# Patient Record
Sex: MALE | Race: WHITE
[De-identification: names, ages, dates, MRNs, and addresses within clinical notes are randomized per-mention and may not be internally consistent; named-entity substitution may affect disease eponyms.]

---

## 2017-04-02 NOTE — RAD
INDICATION:  Cough and fever.



COMPARISON:  Comparison is made with a prior chest x-ray study from February 13, 2012.



TECHNIQUE: Dual-energy PA  and lateral views of the chest were obtained.



FINDINGS:   The heart is within normal limits in size. Mediastinal and hilar contours

appear within normal limits.



The lungs are clear. No pleural effusion is present. 



IMPRESSION:  NO EVIDENCE FOR ACTIVE CARDIOPULMONARY DISEASE.

## 2017-04-02 NOTE — ED
Influenza-Like Illness





- HPI Summary


HPI Summary: 


26M presents with fever for 2 days.  He also admits to headache, cough, sore 

throat, and body aches.  He denies any chest pain or SOB. He has been using 

dayquil and nyquil. He states that his appetite has been about the same.  He 

denies any ear pain or abdominal pain or n/v/d.  He admits to nasal congestion. 








- History of Current Complaint


Chief Complaint: EDFluSymptoms


Time Seen by Provider: 04/02/17 22:19





- Allergy/Home Medications


Allergies/Adverse Reactions: 


 Allergies











Allergy/AdvReac Type Severity Reaction Status Date / Time


 


No Known Allergies Allergy   Verified 10/13/16 14:03














PMH/Surg Hx/FS Hx/Imm Hx


Endocrine/Hematology History: 


   Denies: Hx Anticoagulant Therapy, Hx Blood Disorders, Hx Diabetes, Hx 

Thyroid Disease


Cardiovascular History: 


   Denies: Hx Hypertension


Respiratory History: 


   Denies: Hx Asthma, Hx Chronic Obstructive Pulmonary Disease (COPD)


GI History: 


   Denies: Hx Ulcer


Neurological History: Reports: Hx Seizures - childhood resolved





- Immunization History


Date of Tetanus Vaccine: TODAY


Infectious Disease History: No


Infectious Disease History: 


   Denies: Hx Clostridium Difficile, Hx Hepatitis, Hx Human Immunodeficiency 

Virus (HIV), Hx of Known/Suspected MRSA, Hx Shingles, Hx Tuberculosis, Hx Known/

Suspected VRE, Hx Known/Suspected VRSA, History Other Infectious Disease, 

Traveled Outside the [place] in Last 30 Days





- Family History


Known Family History: Positive: Unknown, Hypertension





- Social History


Alcohol Use: Rare


Substance Use Type: Reports: None


Smoking Status (MU): Light Every Day Tobacco Smoker


Type: Cigarettes


Amount Used/How Often: 1/2-1 PPD


Length of Time of Smoking/Using Tobacco: 4-5 years


Have You Smoked in the Last Year: Yes





Review of Systems


Positive: Fever


Positive: Sore Throat


Negative: Chest Pain


Positive: Cough.  Negative: Shortness Of Breath


Negative: Abdominal Pain, Vomiting, Diarrhea, Nausea


All Other Systems Reviewed And Are Negative: Yes





Physical Exam


Triage Information Reviewed: Yes


Vital Signs On Initial Exam: 


 Initial Vitals











Temp Pulse Resp BP Pulse Ox


 


 101.3 F   109   15   127/73   98 


 


 04/02/17 21:40  04/02/17 21:40  04/02/17 21:40  04/02/17 21:40  04/02/17 21:40











Vital Signs Reviewed: Yes


Appearance: Positive: Ill-Appearing


Skin: Positive: Warm, Dry


Head/Face: Positive: Normal Head/Face Inspection


Eyes: Positive: Normal, Conjunctiva Clear


ENT: Positive: Pharyngeal erythema, TMs normal.  Negative: Tonsillar swelling, 

Tonsillar exudate, Trismus, Muffled/hoarse voice


Neck: Positive: Supple, Nontender, No Lymphadenopathy


Respiratory/Lung Sounds: Positive: Clear to Auscultation, Breath Sounds Present


Cardiovascular: Positive: Normal, RRR


Abdomen Description: Positive: Nontender, Soft


Bowel Sounds: Positive: Present





- Анна Coma Scale


Coma Scale Total: 15





Diagnostics





- Vital Signs


 Vital Signs











  Temp Pulse Resp BP Pulse Ox


 


 04/02/17 21:40  101.3 F  109  15  127/73  98














- Laboratory


Result Diagrams: 


 04/02/17 23:22





 04/02/17 23:22


Lab Statement: Any lab studies that have been ordered have been reviewed, and 

results considered in the medical decision making process.





- Radiology


  ** chest


Xray Interpretation: No Acute Changes


Radiology Interpretation Completed By: Radiologist





Flu Symptom Course/Dx





- Course


Course Of Treatment: 26M presents with fever and cough for 2 days. he also 

admits to nasal congestion and sore throat. he is a smoker. on exam lungs CTA. 

appears ill. chest xray normal. labs normal: wbc 10, flu and strept negative. 

will treat supportaviely told to take ibuprofen every 6 hours. patient 

understands and agrees with plan





- Diagnoses


Differential Diagnosis/HQI/PQRI: Positive: Bronchitis, Influenza, Pneumonia, 

Upper Respiratory Infection


Provider Diagnoses: 


 Upper respiratory infection








Discharge





- Discharge Plan


Condition: Good


Disposition: HOME


Patient Education Materials:  Upper Respiratory Infection (ED)


Referrals: 


[facility] PHYSICIAN REFERRAL [Outside]


Additional Instructions: 


Take ibuprofen every 6 hours for fever


Use saline in the nose for nasal congetion


Use humidifier or place warm bowls of water around the room for cough


Increase fluid intake and eat small amounts of food as tolerated


Establish care with primary care physician


Return to ED if develop chest pain , shortness of breath, or any new or 

worsening symptoms

## 2017-08-25 NOTE — RAD
HISTORY: Trauma, pleuritic chest pain



COMPARISONS: None



TECHNIQUE: Multiple contiguous axial CT scans were obtained of the chest, abdomen, and

pelvis after the administration of intravenous contrast. Coronal and sagittal multiplanar

reformations are submitted for review.. Oral contrast was not administered. Delayed images

were obtained through the abdomen and pelvis.





FINDINGS:    



CHEST



NECK AND THYROID: The lower neck and thyroid are unremarkable.

CHEST WALL: There is no lower cervical, axillary, or supraclavicular lymphadenopathy by

size criteria.



HEART AND PERICARDIUM: The heart is unremarkable.

AORTA AND PULMONARY VASCULATURE: The aorta and pulmonary vasculature are normal.



MEDIASTINUM: There is no mediastinal lymphadenopathy by size criteria.

SIERRA: There is no hilar lymphadenopathy by size criteria.



AIRWAY AND ESOPHAGUS: The airway is unremarkable, without endobronchial filling defect.

The esophagus is grossly normal.

LUNG PARENCHYMA: The lungs are clear.

PLEURA: No pleural abnormalities are noted.



BONES AND SOFT TISSUES: No bone or soft tissue abnormalities are noted.



ABDOMEN/PELVIS:



LIVER: The liver is normal in shape, size, contour, and attenuation.

BILE DUCTS: There is no intrahepatic or extrahepatic biliary dilatation.

GALLBLADDER: The gallbladder is normal, without pericholecystic inflammatory change.



PANCREAS: The pancreas is normal, without mass or ductal dilatation.

SPLEEN: Normal in size and appearance.



UPPER GI TRACT: Evaluation of the gastrointestinal tract is limited by incomplete gastric

distention. The upper GI tract is unremarkable.

SMALL BOWEL \T\ MESENTERY:  The small bowel is normal in contour, course, and caliber.

There is no obstruction or dilatation.

COLON:   The colon is normal in contour, course, caliber. There is no pericolonic

inflammatory change.



ADRENALS: Normal bilaterally.

KIDNEYS: The kidneys are normal in shape, size, contour, and axis. There is no

hydronephrosis or nephrolithiasis.

BLADDER: The bladder is smooth in contour.



PELVIC ORGANS: The prostate gland is normal. The seminal vesicles are symmetric.



AORTA: The aorta is normal.

IVC: Unremarkable



LYMPH NODES: There is no lymphadenopathy by size criteria.



ABDOMINAL WALL: There is no evidence for abdominal wall hernia.

BONES: There is mild disc bulge at L5-S1.

OTHER: There is no free intraperitoneal fluid or free intraperitoneal gas. There is no

active arterial extravasation.



IMPRESSION:

NO ACUTE CT PATHOLOGY OF THE VISUALIZED CHEST, ABDOMEN, OR PELVIS.

## 2018-06-26 NOTE — ED
Head Injury





- HPI Summary


HPI Summary: 





Patient complains of head injury today at 7 PM with subsequent HA and 

laceration to left side head anterior to left ear.  States he was trying to 

remove fencepost from the ground and when he pulled it hit him in the left side 

of the head.  Denies LOC, N/V, AMS, vision change, focal deficits, trauma to 

face, nose, tongue, teeth, lips.  Denies any other pain or injury.  Tetanus 

status unknown.  Medical history is none.  Denies anti-coag.





- History Of Current Complaint


Chief Complaint: EDHeadInjury


Stated Complaint: HEAD INJURY


Time Seen by Provider: 06/26/18 22:33


Hx Obtained From: Patient


Mechanism Of Injury: Blunt Trauma


Onset/Duration: Started Hours Ago


Onset of Pain: Immediate


Severity Currently: Moderate


Severity Initially: Moderate


Pain Intensity: 5


Pain Scale Used: 0-10 Numeric


Location of Head Injury: Temporal


Character: Throbbing


Associated Signs And Symptoms: Headache





- Allergies/Home Medications


Allergies/Adverse Reactions: 


 Allergies











Allergy/AdvReac Type Severity Reaction Status Date / Time


 


No Known Allergies Allergy   Verified 06/26/18 20:00














PMH/Surg Hx/FS Hx/Imm Hx


Endocrine/Hematology History: 


   Denies: Hx Anticoagulant Therapy, Hx Blood Disorders, Hx Diabetes, Hx 

Thyroid Disease


Cardiovascular History: 


   Denies: Hx Hypertension


Respiratory History: 


   Denies: Hx Asthma, Hx Chronic Obstructive Pulmonary Disease (COPD)


GI History: 


   Denies: Hx Ulcer


 History: 


   Denies: Hx Dialysis


Neurological History: Reports: Hx Migraine, Hx Seizures - childhood resolved





- Immunization History


Date of Tetanus Vaccine: TODAY


Infectious Disease History: No


Infectious Disease History: 


   Denies: Hx Clostridium Difficile, Hx Hepatitis, Hx Human Immunodeficiency 

Virus (HIV), Hx of Known/Suspected MRSA, Hx Shingles, Hx Tuberculosis, Hx Known/

Suspected VRE, Hx Known/Suspected VRSA, History Other Infectious Disease, 

Traveled Outside the [place] in Last 30 Days





- Family History


Known Family History: Positive: Unknown - ADOPTED, Hypertension





- Social History


Alcohol Use: Occasionally


Substance Use Type: Reports: None


Smoking Status (MU): Light Every Day Tobacco Smoker


Type: Cigarettes


Amount Used/How Often: 1/2-1 PPD


Length of Time of Smoking/Using Tobacco: 4-5 years


Have You Smoked in the Last Year: Yes





Review of Systems


Constitutional: Negative


Eyes: Negative


ENT: Negative


Cardiovascular: Negative


Respiratory: Negative


Gastrointestinal: Negative


Genitourinary: Negative


Musculoskeletal: Negative


Skin: Other


Positive: Headache


Psychological: Normal


All Other Systems Reviewed And Are Negative: Yes





Physical Exam





- Summary


Physical Exam Summary: 





Neuro exam normal.  Pupils equal and reactive bilaterally.  Full range of 

motion of jaw and neck.  No evidence of trauma to face, nose, lips, tongue, 

teeth, eyes.  No ecchymosis, swelling, deformity, erythema to left side of head 

or face.  2 cm laceration anterior to left ear, is already scabbed over.


Triage Information Reviewed: Yes


Vital Signs On Initial Exam: 


 Initial Vitals











Temp Pulse Resp BP Pulse Ox


 


 98.5 F   74   16   131/88   97 


 


 06/26/18 19:57  06/26/18 19:57  06/26/18 19:57  06/26/18 19:57  06/26/18 19:57











Vital Signs Reviewed: Yes


Appearance: Positive: Well-Appearing


Skin: Positive: Warm


Head/Face: Positive: Normal Head/Face Inspection


Eyes: Positive: Normal


ENT: Positive: Normal ENT inspection


Neck: Positive: Supple


Respiratory/Lung Sounds: Positive: Clear to Auscultation


Cardiovascular: Positive: Normal


Abdomen Description: Positive: Nontender


Musculoskeletal: Positive: Normal


Neurological: Positive: Normal


Psychiatric: Positive: Normal


AVPU Assessment: Alert





- Анна Coma Scale


Best Eye Response: 4 - Spontaneous


Best Motor Response: 6 - Obeys Commands


Best Verbal Response: 5 - Oriented


Coma Scale Total: 15





Diagnostics





- Vital Signs


 Vital Signs











  Temp Pulse Resp BP Pulse Ox


 


 06/26/18 21:58  98.9 F  61  18  140/91  99


 


 06/26/18 19:57  98.5 F  74  16  131/88  97














- Laboratory


Lab Statement: Any lab studies that have been ordered have been reviewed, and 

results considered in the medical decision making process.





Head Injury Course/Dx


Course Of Treatment: Patient complains of head injury today at 7 PM with 

subsequent HA and laceration to left side head anterior to left ear.  States he 

was trying to remove fencepost from the ground and when he pulled it hit him in 

the left side of the head.  Denies LOC, N/V, AMS, vision change, focal deficits

, trauma to face, nose, tongue, teeth, lips.  Denies any other pain or injury.  

Tetanus status unknown.  Medical history is none.  Denies anti-coag.  No 

indication for CT brain as patient denies LOC, N/V, any neuro symptoms.  

Laceration is scabbed over, patient opted to not have his laceration sutured.  

Tetanus status unknown, posterior shot provided here in the ED along with 

Tylenol and ibuprofen for Pain.  Vital signs within normal limits and stable.  

Neuro exam normal.  Rx for Keflex.





- Diagnoses


Provider Diagnoses: 


 Head injury








Discharge





- Sign-Out/Discharge


Documenting (check all that apply): Discharge/Admit/Transfer





- Discharge Plan


Condition: Stable


Disposition: HOME


Patient Education Materials:  Head Injury (ED)


Referrals: 


No Primary Care Phys,NOPCP [Primary Care Provider] - 


[facility] [Outside]


Additional Instructions: 


Take antibiotics as directed.  Follow-up with primary care.  Return to the ED 

for any new or worsening symptoms





- Billing Disposition and Condition


Condition: STABLE


Disposition: Home

## 2020-02-03 ENCOUNTER — HOSPITAL ENCOUNTER (EMERGENCY)
Dept: HOSPITAL 25 - UCEAST | Age: 30
Discharge: HOME | End: 2020-02-03
Payer: COMMERCIAL

## 2020-02-03 VITALS — DIASTOLIC BLOOD PRESSURE: 80 MMHG | SYSTOLIC BLOOD PRESSURE: 120 MMHG

## 2020-02-03 DIAGNOSIS — R10.84: Primary | ICD-10-CM

## 2020-02-03 DIAGNOSIS — F17.210: ICD-10-CM

## 2020-02-03 PROCEDURE — 81003 URINALYSIS AUTO W/O SCOPE: CPT

## 2020-02-03 PROCEDURE — G0463 HOSPITAL OUTPT CLINIC VISIT: HCPCS

## 2020-02-03 PROCEDURE — 99211 OFF/OP EST MAY X REQ PHY/QHP: CPT

## 2020-02-03 NOTE — UC
Abdominal Pain Male HPI





- HPI Summary


HPI Summary: 


Patient is a 28yo male presenting with generalized intermittent abdominal pain 

x2 days. Described it as dull ache. Patient states he ate clams on Friday night 

and symptoms began shortly after. States he vomited twice at symptoms onset. 

Denies hematemesis. Denies any n/v since. Denies diarrhea or changes in BMs. 

Notes slight decreased appetite and fluid intake. Denies current pain or 

nausea. Denies urinary symptoms. Does note mild dry cough and runny nose. 

Denies fever and chills. Notes body aches. Denies taking anything for symptom 

relief.








- History of Current Complaint


Chief Complaint: UCAbdominalPain


Stated Complaint: ABDOMINAL PAIN,NAUSEA


Hx Obtained From: Patient


Pain Intensity: 5


Pain Scale Used: 0-10 Numeric





- Allergies/Home Medications


Allergies/Adverse Reactions: 


 Allergies











Allergy/AdvReac Type Severity Reaction Status Date / Time


 


No Known Allergies Allergy   Verified 02/03/20 14:40











Home Medications: 


 Home Medications





NK [No Home Medications Reported]  02/03/20 [History Confirmed 02/03/20]











PMH/Surg Hx/FS Hx/Imm Hx


Other History Of: 


   Negative For: Anticoagulant Therapy





- Surgical History


Surgical History: None





- Family History


Known Family History: Positive: Unknown - ADOPTED, Hypertension





- Social History


Alcohol Use: Rare


Substance Use Type: None


Smoking Status (MU): Light Every Day Tobacco Smoker


Type: Cigarettes


Amount Used/How Often: 1/2-1 PPD


Length of Time of Smoking/Using Tobacco: 4-5 years


Have You Smoked in the Last Year: Yes


Household Exposure Type: Cigarettes





- Immunization History


Most Recent Influenza Vaccination: none





Review of Systems


All Other Systems Reviewed And Are Negative: Yes


Constitutional: Positive: Negative





Physical Exam





- Summary


Physical Exam Summary: 


Vital Signs Reviewed: Yes


A+Ox3, no distress, well-appearing


Eyes: Conjunctiva Clear


ENT: Hearing grossly normal  TM x 2 clear, moist, uvula midline, no exudate, no 

erythema


Neck: Positive: Supple


Respiratory: Positive: No respiratory distress, No accessory muscle use + CTA 

throughout  no w/r


Cardiovascular: RRR  nl s1, s2  no m/r 


Abd: soft + BS nt/nd  no guarding


Musculoskeletal Exam: MELGAR x 4 without difficulty


Neurological: Positive: Alert


Psychological: Positive: age appropriate behavior


Skin: Positive: no rash, no ecchymosis














Vital Signs: 


 Initial Vital Signs











Temp  99 F   02/03/20 14:34


 


Pulse  52   02/03/20 14:34


 


Resp  16   02/03/20 14:34


 


BP  111/74   02/03/20 14:34


 


Pulse Ox  98   02/03/20 14:34














Abd Pain Male Course/Dx





- Course


Course Of Treatment: 


UA negative. Rapid flu negative. Patient VS and PE findings WNL. No pain 

distress. Educated patient on symptomatic treatment, including bland diet, otc 

analgesics, and maintaining hydration. Instructed to go to ED with any new or 

worsening symptoms. Patient voiced understanding and agreed with treatment plan.








- Differential Dx/Clinical Impression


Provider Diagnosis: 


 Acute generalized abdominal pain








Discharge ED





- Sign-Out/Discharge


Documenting (check all that apply): Patient Departure


All imaging exams completed and their final reports reviewed: No Studies





- Discharge Plan


Condition: Stable


Disposition: HOME


Patient Education Materials:  Acute Abdominal Pain (ED)


Referrals: 


[facility] [Outside] - If Needed


Additional Instructions: 


Your flu test was negative today.


You may take tylenol as directed for pain relief.


Get plenty of rest and increase your fluid intake.


Eat a bland diet, such as bread, bananas, and rice while symptoms are present.


Go to the emergency room if your symptoms do not resolve or you develop fever, 

severe abdominal pain, excessive vomiting, or are unable to keep fluids down.











- Billing Disposition and Condition


Condition: STABLE


Disposition: Home

## 2020-02-12 ENCOUNTER — HOSPITAL ENCOUNTER (EMERGENCY)
Dept: HOSPITAL 25 - UCEAST | Age: 30
Discharge: HOME | End: 2020-02-12
Payer: COMMERCIAL

## 2020-02-12 VITALS — DIASTOLIC BLOOD PRESSURE: 82 MMHG | SYSTOLIC BLOOD PRESSURE: 119 MMHG

## 2020-02-12 DIAGNOSIS — R09.81: ICD-10-CM

## 2020-02-12 DIAGNOSIS — M79.10: Primary | ICD-10-CM

## 2020-02-12 DIAGNOSIS — F17.210: ICD-10-CM

## 2020-02-12 DIAGNOSIS — R50.9: ICD-10-CM

## 2020-02-12 DIAGNOSIS — R51: ICD-10-CM

## 2020-02-12 DIAGNOSIS — R05: ICD-10-CM

## 2020-02-12 PROCEDURE — 99212 OFFICE O/P EST SF 10 MIN: CPT

## 2020-02-12 PROCEDURE — G0463 HOSPITAL OUTPT CLINIC VISIT: HCPCS

## 2020-02-12 NOTE — UC
FLU HPI





- HPI Summary


HPI Summary: 





29-year-old male who had cold symptoms since last Friday and then yesterday 

started experiencing flulike illness with fever and body aches.





- History of Current Complaint


Chief Complaint: UCGeneralIllness


Stated Complaint: FLU LIKE SYMPTOMS


Time Seen by Provider: 02/12/20 13:29


Hx Obtained From: Patient


Onset/Duration: Gradual Onset


Severity Currently: Moderate


Severity Initially: Mild


Pain Intensity: 7


Associated Signs & Symptoms: Positive: Fever, Myalgia, Cough, Nasal Congestion, 

Headache


Related Hx: Possible Flu/Infectious Exposure





- Allergy/Home Medications


Allergies/Adverse Reactions: 


 Allergies











Allergy/AdvReac Type Severity Reaction Status Date / Time


 


No Known Allergies Allergy   Verified 02/12/20 13:15














PMH/Surg Hx/FS Hx/Imm Hx


Previously Healthy: Yes


Other History Of: 


   Negative For: Anticoagulant Therapy





- Surgical History


Surgical History: None





- Family History


Known Family History: Positive: Unknown - ADOPTED, Hypertension





- Social History


Occupation: Employed Full-time


Lives: With Family


Alcohol Use: Rare


Substance Use Type: None


Smoking Status (MU): Light Every Day Tobacco Smoker


Type: Cigarettes


Amount Used/How Often: 1/2-1 PPD


Length of Time of Smoking/Using Tobacco: 4-5 years


Have You Smoked in the Last Year: Yes


Household Exposure Type: Cigarettes





- Immunization History


Most Recent Influenza Vaccination: none





Review of Systems


All Other Systems Reviewed And Are Negative: Yes


Constitutional: Positive: Fever, Chills


ENT: Positive: Nasal Discharge


Respiratory: Positive: Cough


Musculoskeletal: Positive: Myalgia


Neurological/Mental Status: Positive: Headache


Is Patient Immunocompromised?: No





Physical Exam


Triage Information Reviewed: Yes


Appearance: Well-Appearing, No Pain Distress, Well-Nourished


Vital Signs: 


 Initial Vital Signs











Temp  102.2 F   02/12/20 13:13


 


Pulse  110   02/12/20 13:13


 


Resp  18   02/12/20 13:13


 


BP  119/82   02/12/20 13:13


 


Pulse Ox  98   02/12/20 13:13











Vital Signs Reviewed: Yes


Eyes: Positive: Conjunctiva Clear


ENT: Positive: Hearing grossly normal, Pharynx normal, Nasal drainage - Clear 

nasal coryza, TMs normal, Uvula midline


Neck: Positive: Supple, Nontender, No Lymphadenopathy


Respiratory: Positive: Lungs clear, Normal breath sounds, No accessory muscle 

use - Moist loose cough.


Cardiovascular: Positive: No Murmur, Pulses Normal, Brisk Capillary Refill, 

Tachycardia


Abdomen Description: Positive: Nontender, No Organomegaly, Soft.  Negative: CVA 

Tenderness (R), CVA Tenderness (L), Distended, Guarding, Hepatomegaly, McBurney'

s Point Tenderness, Splenomegaly


Bowel Sounds: Positive: Present


Musculoskeletal Exam: Normal


Neurological Exam: Normal


Psychological Exam: Normal


Skin Exam: Normal





Flu Course/Dx





- Course


Course Of Treatment: 





Patient is comfortable here.  Rapid flu test was negative.





- Differential Dx/Diagnosis


Provider Diagnosis: 


 Flu-like symptoms








Discharge ED





- Sign-Out/Discharge


Documenting (check all that apply): Patient Departure


All imaging exams completed and their final reports reviewed: No Studies





- Discharge Plan


Condition: Fair


Disposition: HOME


Patient Education Materials:  Influenza (DC)


Forms:  *Work Release


Referrals: 


[facility] [Outside]


[facility] PHYS[facility]IAN REFERRAL [Outside]


No Primary Care Phys,NOPCP [Primary Care Provider] - 


Additional Instructions: 


Increase fluids, over-the-counter cold medicines as directed, rest, follow up 

at [facility] clinic if no improvement in 3 or 4 days.





- Billing Disposition and Condition


Condition: FAIR


Disposition: Home